# Patient Record
Sex: FEMALE | Race: WHITE | Employment: FULL TIME | URBAN - NONMETROPOLITAN AREA
[De-identification: names, ages, dates, MRNs, and addresses within clinical notes are randomized per-mention and may not be internally consistent; named-entity substitution may affect disease eponyms.]

---

## 2020-02-16 ENCOUNTER — HOSPITAL ENCOUNTER (EMERGENCY)
Age: 30
Discharge: HOME | End: 2020-02-16
Payer: COMMERCIAL

## 2020-02-16 VITALS
RESPIRATION RATE: 16 BRPM | SYSTOLIC BLOOD PRESSURE: 130 MMHG | HEART RATE: 93 BPM | TEMPERATURE: 98.9 F | HEIGHT: 67 IN | WEIGHT: 135 LBS | BODY MASS INDEX: 21.19 KG/M2 | OXYGEN SATURATION: 99 % | DIASTOLIC BLOOD PRESSURE: 72 MMHG

## 2020-02-16 LAB
GROUP A STREP CULTURE, REFLEX: NEGATIVE
REFLEX THROAT C + S: NORMAL

## 2020-02-16 PROCEDURE — 87253 VIRUS INOCULATE TISSUE ADDL: CPT

## 2020-02-16 PROCEDURE — 87880 STREP A ASSAY W/OPTIC: CPT

## 2020-02-16 PROCEDURE — 99203 OFFICE O/P NEW LOW 30 MIN: CPT

## 2020-02-16 PROCEDURE — 87252 VIRUS INOCULATION TISSUE: CPT

## 2020-02-16 PROCEDURE — 87070 CULTURE OTHR SPECIMN AEROBIC: CPT

## 2020-02-16 PROCEDURE — 87140 CULTURE TYPE IMMUNOFLUORESC: CPT

## 2020-02-16 PROCEDURE — 99213 OFFICE O/P EST LOW 20 MIN: CPT | Performed by: NURSE PRACTITIONER

## 2020-02-16 RX ORDER — AMOXICILLIN 500 MG/1
500 CAPSULE ORAL 2 TIMES DAILY
Qty: 20 CAPSULE | Refills: 0 | Status: SHIPPED | OUTPATIENT
Start: 2020-02-16 | End: 2020-02-26

## 2020-02-16 ASSESSMENT — ENCOUNTER SYMPTOMS
SINUS PRESSURE: 0
VOMITING: 0
SHORTNESS OF BREATH: 0
DIARRHEA: 0
ABDOMINAL PAIN: 0
SORE THROAT: 1
NAUSEA: 0
COUGH: 0

## 2020-02-16 ASSESSMENT — PAIN DESCRIPTION - LOCATION: LOCATION: THROAT;MOUTH

## 2020-02-16 ASSESSMENT — PAIN SCALES - GENERAL: PAINLEVEL_OUTOF10: 7

## 2020-02-16 NOTE — ED PROVIDER NOTES
[phone]       Chief Complaint   Patient presents with    Mouth Lesions       Nurses Notes reviewed and I agree except as noted in the HPI. HISTORY OF PRESENT ILLNESS   Lashon Renteria is a 30 y.o. female who presents for evaluation of a canker sore to the lower lip (inside) that started last week and is very painful. Patient also complains of a sore throat and chills that started last night. REVIEW OF SYSTEMS     Review of Systems   Constitutional: Positive for chills. Negative for fever. HENT: Positive for sore throat. Negative for congestion, ear pain and sinus pressure. Canker sore   Respiratory: Negative for cough and shortness of breath. Cardiovascular: Negative for chest pain. Gastrointestinal: Negative for abdominal pain, diarrhea, nausea and vomiting. Skin: Negative for rash. Allergic/Immunologic: Negative for environmental allergies and food allergies. Neurological: Negative for headaches. Hematological: Negative for adenopathy. PAST MEDICAL HISTORY   History reviewed. No pertinent past medical history. SURGICAL HISTORY     Patient  has a past surgical history that includes Lithotripsy; Ureter stent placement; and Mandible surgery. CURRENT MEDICATIONS       Discharge Medication List as of 2/16/2020  2:02 PM      CONTINUE these medications which have NOT CHANGED    Details   NONFORMULARY daily             ALLERGIES     Patient is has No Known Allergies. FAMILY HISTORY     Patient'sfamily history is not on file. SOCIAL HISTORY     Patient  reports that she has never smoked. She has never used smokeless tobacco. She reports that she does not drink alcohol or use drugs. PHYSICAL EXAM     ED TRIAGE VITALS  BP: 130/72, Temp: 98.9 °F (37.2 °C), Pulse: 93, Resp: 16, SpO2: 99 %  Physical Exam  Vitals signs and nursing note reviewed.    Constitutional:       General: She is not in acute 2. Mouth sore        DISPOSITION/PLAN   DISPOSITION    Discharge    ED Course as of Feb 17 0750   Mon Feb 17, 2020   0750 GROUP A STREP CULTURE, REFLEX: NEGATIVE [HA]   0750 REFLEX THROAT C + S: INDICATED [HA]   0750 Herpes Simplex Virus Culture [HA]      ED Course User Index  Shai Meghann Arredondo, JESICA - BERNADETTE     Physical assessment findings, diagnostic testing(s) if applicable, and vital signs reviewed with patient/patient representative. Questions answered. If applicable, patient/patient representative will be contacted upon receipt of final culture and sensitivity or other testing results when available. Any additions or changes to medications or changes the plan of care will be made at that time. Medications as directed, including OTC medications for supportive care. Education provided on medications. Differential diagnosis(s) discussed with patient/patient representative. Home care/self care instructions reviewed with patient/patient representative. Patient is to follow-up with family care provider in 2-3 days if no improvement. Patient is to go to the emergency department if symptoms worsen. Patient/patient representative is aware of care plan, questions answered, verbalizes understanding and is in agreement. Teach back method used for patient/patient representative teaching(s) and printed instructions attached to after visit summary.       Problem List Items Addressed This Visit     None      Visit Diagnoses     Sore throat    -  Primary    Relevant Medications    amoxicillin (AMOXIL) 500 MG capsule    Mouth sore              PATIENT REFERRED TO:  Concepcion Ruiz  [address] [place] [address]  [phone]    Schedule an appointment as soon as possible for a visit in 3 days  for further evalation, If symptoms worsen, GO DIRECTLY TO [address] [place] [id number] [phone]    As needed, If symptoms worsen, GO DIRECTLY TO [address] [place] [address], 97 Chely Joy, APRN - CNP  02/17/20 [id number]

## 2020-02-16 NOTE — ED NOTES
Discharge assessment complete. No changes. All discharge education and information given. Instructed to go to ED for any worsening symptoms. Verbalized Understanding. Left in stable cond.      Wilner Briggs RN  02/16/20 [id number]

## 2020-02-18 LAB — THROAT/NOSE CULTURE: NORMAL

## 2020-02-19 LAB
HERPES SIMPLEX CULTURE: NORMAL
HSV TYPE 1: NORMAL

## 2021-10-28 ENCOUNTER — NURSE ONLY (OUTPATIENT)
Dept: LAB | Age: 31
End: 2021-10-28